# Patient Record
(demographics unavailable — no encounter records)

---

## 2025-05-27 NOTE — PHYSICAL EXAM
[de-identified] : Right knee exam  No swelling or effusion ROM 0 to 135 minimally diminished compared to contralateral side No joint line tenderness, - Radha's Neurovascularly intact distally

## 2025-05-27 NOTE — DISCUSSION/SUMMARY
[de-identified] : Right knee s/p medial meniscus root repair almost 3 months ago. He is doing extremely well, he has been able to perform activities without pain. He only has mild tightness with deep flexion. Discussed this may continue to improve with time. He will finish out PT and transition to home exercise/regular gym routine. Follow up as needed.

## 2025-05-27 NOTE — HISTORY OF PRESENT ILLNESS
[de-identified] : Trung follows up for his right knee s/p meniscus root repair, about 2.5 months post op. He is doing extremely well. He notes he has been able to do all activity as this point but will have some mild tightness with deep flexion. He has no complaints.

## 2025-05-27 NOTE — REVIEW OF SYSTEMS
[Joint Stiffness] : joint stiffness [Negative] : Heme/Lymph [[email or web address]] : Right knee s/p surgery